# Patient Record
Sex: MALE | Race: WHITE | Employment: FULL TIME | URBAN - METROPOLITAN AREA
[De-identification: names, ages, dates, MRNs, and addresses within clinical notes are randomized per-mention and may not be internally consistent; named-entity substitution may affect disease eponyms.]

---

## 2017-09-18 PROBLEM — M54.50 CHRONIC BILATERAL LOW BACK PAIN WITHOUT SCIATICA: Status: ACTIVE | Noted: 2017-09-18

## 2017-09-18 PROBLEM — G89.29 CHRONIC BILATERAL LOW BACK PAIN WITHOUT SCIATICA: Status: ACTIVE | Noted: 2017-09-18

## 2018-04-15 ENCOUNTER — HOSPITAL ENCOUNTER (EMERGENCY)
Age: 33
Discharge: HOME | End: 2018-04-15
Payer: MEDICARE

## 2018-04-15 ENCOUNTER — APPOINTMENT (OUTPATIENT)
Dept: CT IMAGING | Age: 33
End: 2018-04-15
Payer: MEDICARE

## 2018-04-15 VITALS
WEIGHT: 190 LBS | HEIGHT: 69 IN | BODY MASS INDEX: 28.14 KG/M2 | TEMPERATURE: 98 F | OXYGEN SATURATION: 98 % | SYSTOLIC BLOOD PRESSURE: 122 MMHG | DIASTOLIC BLOOD PRESSURE: 76 MMHG | HEART RATE: 76 BPM | RESPIRATION RATE: 14 BRPM

## 2018-04-15 DIAGNOSIS — R10.9 ABDOMINAL PAIN, UNSPECIFIED ABDOMINAL LOCATION: Primary | ICD-10-CM

## 2018-04-15 DIAGNOSIS — I88.0 MESENTERIC LYMPHADENITIS: ICD-10-CM

## 2018-04-15 LAB
ALBUMIN SERPL-MCNC: 4.2 G/DL (ref 3.5–5.2)
ALP BLD-CCNC: 52 U/L (ref 40–129)
ALT SERPL-CCNC: 29 U/L (ref 0–40)
ANION GAP SERPL CALCULATED.3IONS-SCNC: 12 MMOL/L (ref 7–16)
AST SERPL-CCNC: 25 U/L (ref 0–39)
BASOPHILS ABSOLUTE: 0.03 E9/L (ref 0–0.2)
BASOPHILS RELATIVE PERCENT: 0.5 % (ref 0–2)
BILIRUB SERPL-MCNC: 0.3 MG/DL (ref 0–1.2)
BILIRUBIN URINE: NEGATIVE
BLOOD, URINE: NEGATIVE
BUN BLDV-MCNC: 16 MG/DL (ref 6–20)
CALCIUM SERPL-MCNC: 8.9 MG/DL (ref 8.6–10.2)
CHLORIDE BLD-SCNC: 102 MMOL/L (ref 98–107)
CLARITY: CLEAR
CO2: 28 MMOL/L (ref 22–29)
COLOR: YELLOW
CREAT SERPL-MCNC: 0.9 MG/DL (ref 0.7–1.2)
EOSINOPHILS ABSOLUTE: 0.11 E9/L (ref 0.05–0.5)
EOSINOPHILS RELATIVE PERCENT: 2 % (ref 0–6)
GFR AFRICAN AMERICAN: >60
GFR NON-AFRICAN AMERICAN: >60 ML/MIN/1.73
GLUCOSE BLD-MCNC: 82 MG/DL (ref 74–109)
GLUCOSE URINE: NEGATIVE MG/DL
HCT VFR BLD CALC: 44.6 % (ref 37–54)
HEMOGLOBIN: 15.8 G/DL (ref 12.5–16.5)
IMMATURE GRANULOCYTES #: 0.01 E9/L
IMMATURE GRANULOCYTES %: 0.2 % (ref 0–5)
KETONES, URINE: NEGATIVE MG/DL
LACTIC ACID: 0.9 MMOL/L (ref 0.5–2.2)
LEUKOCYTE ESTERASE, URINE: NEGATIVE
LIPASE: 27 U/L (ref 13–60)
LYMPHOCYTES ABSOLUTE: 1.64 E9/L (ref 1.5–4)
LYMPHOCYTES RELATIVE PERCENT: 30 % (ref 20–42)
MCH RBC QN AUTO: 30.3 PG (ref 26–35)
MCHC RBC AUTO-ENTMCNC: 35.4 % (ref 32–34.5)
MCV RBC AUTO: 85.6 FL (ref 80–99.9)
MONOCYTES ABSOLUTE: 0.61 E9/L (ref 0.1–0.95)
MONOCYTES RELATIVE PERCENT: 11.2 % (ref 2–12)
NEUTROPHILS ABSOLUTE: 3.06 E9/L (ref 1.8–7.3)
NEUTROPHILS RELATIVE PERCENT: 56.1 % (ref 43–80)
NITRITE, URINE: NEGATIVE
PDW BLD-RTO: 12 FL (ref 11.5–15)
PH UA: 6 (ref 5–9)
PLATELET # BLD: 145 E9/L (ref 130–450)
PMV BLD AUTO: 10.9 FL (ref 7–12)
POTASSIUM SERPL-SCNC: 3.9 MMOL/L (ref 3.5–5)
PROTEIN UA: NEGATIVE MG/DL
RBC # BLD: 5.21 E12/L (ref 3.8–5.8)
SODIUM BLD-SCNC: 142 MMOL/L (ref 132–146)
SPECIFIC GRAVITY UA: 1.02 (ref 1–1.03)
TOTAL PROTEIN: 6.7 G/DL (ref 6.4–8.3)
UROBILINOGEN, URINE: 0.2 E.U./DL
WBC # BLD: 5.5 E9/L (ref 4.5–11.5)

## 2018-04-15 PROCEDURE — 85025 COMPLETE CBC W/AUTO DIFF WBC: CPT

## 2018-04-15 PROCEDURE — 36415 COLL VENOUS BLD VENIPUNCTURE: CPT

## 2018-04-15 PROCEDURE — 83690 ASSAY OF LIPASE: CPT

## 2018-04-15 PROCEDURE — 81003 URINALYSIS AUTO W/O SCOPE: CPT

## 2018-04-15 PROCEDURE — 74177 CT ABD & PELVIS W/CONTRAST: CPT

## 2018-04-15 PROCEDURE — 83605 ASSAY OF LACTIC ACID: CPT

## 2018-04-15 PROCEDURE — 99284 EMERGENCY DEPT VISIT MOD MDM: CPT

## 2018-04-15 PROCEDURE — 80053 COMPREHEN METABOLIC PANEL: CPT

## 2018-04-15 PROCEDURE — 6360000004 HC RX CONTRAST MEDICATION: Performed by: RADIOLOGY

## 2018-04-15 PROCEDURE — 2580000003 HC RX 258: Performed by: NURSE PRACTITIONER

## 2018-04-15 PROCEDURE — 2580000003 HC RX 258: Performed by: RADIOLOGY

## 2018-04-15 RX ORDER — DICYCLOMINE HYDROCHLORIDE 10 MG/1
20 CAPSULE ORAL
Qty: 15 CAPSULE | Refills: 0 | Status: SHIPPED | OUTPATIENT
Start: 2018-04-15 | End: 2022-02-02

## 2018-04-15 RX ORDER — ISOTRETINOIN 40 MG/1
40 CAPSULE ORAL ONCE
COMMUNITY
End: 2022-01-19

## 2018-04-15 RX ORDER — DICYCLOMINE HYDROCHLORIDE 10 MG/ML
20 INJECTION INTRAMUSCULAR ONCE
Status: DISCONTINUED | OUTPATIENT
Start: 2018-04-15 | End: 2018-04-15 | Stop reason: HOSPADM

## 2018-04-15 RX ORDER — SODIUM CHLORIDE 0.9 % (FLUSH) 0.9 %
10 SYRINGE (ML) INJECTION
Status: COMPLETED | OUTPATIENT
Start: 2018-04-15 | End: 2018-04-15

## 2018-04-15 RX ORDER — 0.9 % SODIUM CHLORIDE 0.9 %
1000 INTRAVENOUS SOLUTION INTRAVENOUS ONCE
Status: COMPLETED | OUTPATIENT
Start: 2018-04-15 | End: 2018-04-15

## 2018-04-15 RX ORDER — DOCUSATE SODIUM 100 MG/1
100 CAPSULE, LIQUID FILLED ORAL 2 TIMES DAILY PRN
Qty: 20 CAPSULE | Refills: 0 | Status: SHIPPED | OUTPATIENT
Start: 2018-04-15 | End: 2018-04-20

## 2018-04-15 RX ORDER — IBUPROFEN 800 MG/1
800 TABLET ORAL EVERY 6 HOURS PRN
Qty: 20 TABLET | Refills: 0 | Status: SHIPPED | OUTPATIENT
Start: 2018-04-15 | End: 2022-02-02

## 2018-04-15 RX ADMIN — Medication 10 ML: at 14:48

## 2018-04-15 RX ADMIN — IOPAMIDOL 110 ML: 755 INJECTION, SOLUTION INTRAVENOUS at 14:48

## 2018-04-15 RX ADMIN — SODIUM CHLORIDE 1000 ML: 9 INJECTION, SOLUTION INTRAVENOUS at 13:12

## 2020-09-13 ENCOUNTER — APPOINTMENT (OUTPATIENT)
Dept: GENERAL RADIOLOGY | Age: 35
End: 2020-09-13
Payer: COMMERCIAL

## 2020-09-13 ENCOUNTER — HOSPITAL ENCOUNTER (EMERGENCY)
Age: 35
Discharge: HOME | End: 2020-09-13
Attending: EMERGENCY MEDICINE
Payer: COMMERCIAL

## 2020-09-13 VITALS
TEMPERATURE: 97.2 F | SYSTOLIC BLOOD PRESSURE: 118 MMHG | RESPIRATION RATE: 16 BRPM | WEIGHT: 185 LBS | BODY MASS INDEX: 27.4 KG/M2 | HEART RATE: 89 BPM | DIASTOLIC BLOOD PRESSURE: 76 MMHG | HEIGHT: 69 IN | OXYGEN SATURATION: 98 %

## 2020-09-13 PROCEDURE — 90471 IMMUNIZATION ADMIN: CPT | Performed by: STUDENT IN AN ORGANIZED HEALTH CARE EDUCATION/TRAINING PROGRAM

## 2020-09-13 PROCEDURE — 71045 X-RAY EXAM CHEST 1 VIEW: CPT

## 2020-09-13 PROCEDURE — 73030 X-RAY EXAM OF SHOULDER: CPT

## 2020-09-13 PROCEDURE — 99282 EMERGENCY DEPT VISIT SF MDM: CPT

## 2020-09-13 PROCEDURE — 6360000002 HC RX W HCPCS: Performed by: STUDENT IN AN ORGANIZED HEALTH CARE EDUCATION/TRAINING PROGRAM

## 2020-09-13 PROCEDURE — 96372 THER/PROPH/DIAG INJ SC/IM: CPT

## 2020-09-13 PROCEDURE — 90715 TDAP VACCINE 7 YRS/> IM: CPT | Performed by: STUDENT IN AN ORGANIZED HEALTH CARE EDUCATION/TRAINING PROGRAM

## 2020-09-13 PROCEDURE — 99283 EMERGENCY DEPT VISIT LOW MDM: CPT

## 2020-09-13 PROCEDURE — 73000 X-RAY EXAM OF COLLAR BONE: CPT

## 2020-09-13 RX ORDER — KETOROLAC TROMETHAMINE 30 MG/ML
30 INJECTION, SOLUTION INTRAMUSCULAR; INTRAVENOUS ONCE
Status: COMPLETED | OUTPATIENT
Start: 2020-09-13 | End: 2020-09-13

## 2020-09-13 RX ORDER — NAPROXEN 500 MG/1
500 TABLET ORAL 2 TIMES DAILY
Qty: 60 TABLET | Refills: 0 | Status: SHIPPED | OUTPATIENT
Start: 2020-09-13 | End: 2022-01-19

## 2020-09-13 RX ADMIN — TETANUS TOXOID, REDUCED DIPHTHERIA TOXOID AND ACELLULAR PERTUSSIS VACCINE, ADSORBED 0.5 ML: 5; 2.5; 8; 8; 2.5 SUSPENSION INTRAMUSCULAR at 08:02

## 2020-09-13 RX ADMIN — KETOROLAC TROMETHAMINE 30 MG: 30 INJECTION, SOLUTION INTRAMUSCULAR at 08:02

## 2020-09-13 ASSESSMENT — ENCOUNTER SYMPTOMS
WHEEZING: 0
CONSTIPATION: 0
ABDOMINAL PAIN: 0
SHORTNESS OF BREATH: 0
CHEST TIGHTNESS: 0
DIARRHEA: 0
VOMITING: 0
COUGH: 0
COLOR CHANGE: 0
NAUSEA: 0
BACK PAIN: 0

## 2020-09-13 ASSESSMENT — PAIN DESCRIPTION - FREQUENCY: FREQUENCY: CONTINUOUS

## 2020-09-13 ASSESSMENT — PAIN DESCRIPTION - ORIENTATION: ORIENTATION: RIGHT

## 2020-09-13 ASSESSMENT — PAIN SCALES - GENERAL
PAINLEVEL_OUTOF10: 8
PAINLEVEL_OUTOF10: 8

## 2020-09-13 ASSESSMENT — PAIN DESCRIPTION - PAIN TYPE: TYPE: ACUTE PAIN

## 2020-09-13 ASSESSMENT — PAIN DESCRIPTION - ONSET: ONSET: ON-GOING

## 2020-09-13 ASSESSMENT — PAIN DESCRIPTION - LOCATION: LOCATION: SHOULDER

## 2020-09-13 ASSESSMENT — PAIN DESCRIPTION - PROGRESSION: CLINICAL_PROGRESSION: NOT CHANGED

## 2020-09-13 ASSESSMENT — PAIN DESCRIPTION - DESCRIPTORS: DESCRIPTORS: ACHING;SHARP

## 2020-09-13 NOTE — ED NOTES
Patient has visitor waiting in car. Ricarda Chu, [phone]. RN to call when patient able to have visitor.       Peggy Macario RN  09/13/20 1012
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
## 2020-12-11 LAB — SARS-COV-2: NORMAL

## 2021-07-08 VITALS
BODY MASS INDEX: 27.85 KG/M2 | RESPIRATION RATE: 16 BRPM | HEART RATE: 57 BPM | HEIGHT: 69 IN | DIASTOLIC BLOOD PRESSURE: 84 MMHG | TEMPERATURE: 97.9 F | WEIGHT: 188 LBS | OXYGEN SATURATION: 98 % | SYSTOLIC BLOOD PRESSURE: 122 MMHG

## 2022-01-19 ENCOUNTER — HOSPITAL ENCOUNTER (EMERGENCY)
Age: 37
Discharge: HOME | End: 2022-01-19
Payer: COMMERCIAL

## 2022-01-19 ENCOUNTER — APPOINTMENT (OUTPATIENT)
Dept: GENERAL RADIOLOGY | Age: 37
End: 2022-01-19
Payer: COMMERCIAL

## 2022-01-19 VITALS
TEMPERATURE: 97.4 F | RESPIRATION RATE: 16 BRPM | SYSTOLIC BLOOD PRESSURE: 126 MMHG | OXYGEN SATURATION: 98 % | HEIGHT: 69 IN | DIASTOLIC BLOOD PRESSURE: 74 MMHG | BODY MASS INDEX: 23.11 KG/M2 | HEART RATE: 57 BPM | WEIGHT: 156 LBS

## 2022-01-19 DIAGNOSIS — S93.491A SPRAIN OF ANTERIOR TALOFIBULAR LIGAMENT OF RIGHT ANKLE, INITIAL ENCOUNTER: ICD-10-CM

## 2022-01-19 DIAGNOSIS — S92.214A CLOSED NONDISPLACED FRACTURE OF CUBOID OF RIGHT FOOT, INITIAL ENCOUNTER: Primary | ICD-10-CM

## 2022-01-19 PROCEDURE — 73610 X-RAY EXAM OF ANKLE: CPT

## 2022-01-19 PROCEDURE — 99283 EMERGENCY DEPT VISIT LOW MDM: CPT

## 2022-01-19 PROCEDURE — 73630 X-RAY EXAM OF FOOT: CPT

## 2022-01-19 ASSESSMENT — PAIN DESCRIPTION - ORIENTATION: ORIENTATION: RIGHT

## 2022-01-19 ASSESSMENT — PAIN DESCRIPTION - LOCATION: LOCATION: ANKLE

## 2022-01-19 ASSESSMENT — PAIN DESCRIPTION - PAIN TYPE: TYPE: ACUTE PAIN

## 2022-01-19 ASSESSMENT — PAIN SCALES - GENERAL: PAINLEVEL_OUTOF10: 8

## 2022-01-19 NOTE — Clinical Note
Nawaf Sifuetnes was seen and treated in our emergency department on 1/19/2022. He may return to work on 01/21/2022. If you have any questions or concerns, please don't hesitate to call.       Kimberly Strange, APRN - CNP

## 2022-01-20 NOTE — ED PROVIDER NOTES
[address]  Department of Emergency Medicine   ED  Encounter Note  Admit Date/RoomTime: 2022  9:21 PM  ED Room: 15/15    NAME: Treva Winter  : 1985  MRN: [id number]     Chief Complaint:  Ankle Pain (right ankle injured after jumping at [employer])    History of Present Illness       Treva Winter is a 31 y.o. old male who presents to the emergency department for evaluation of right ankle pain that occurred about 3 hours ago. Patient was at [employer] when he felt a pop in his ankle. He has no history of fracture, surgical intervention or dislocation of this area in the past.  He took a dose of ibuprofen with some improvement in his pain that is aggravated with ambulation. He has noticed some swelling and some faint bruising to the area with no open wound, redness or warmth. He reports his tetanus vaccination to be up-to-date. His symptoms are moderate in severity and persistent in nature. ROS   Pertinent positives and negatives are stated within HPI, all other systems reviewed and are negative. Past Medical History:  has no past medical history on file. Surgical History:  has no past surgical history on file. Social History:  reports that he has never smoked. He has quit using smokeless tobacco. He reports current alcohol use. He reports that he does not use drugs. Family History: family history is not on file. Allergies: Patient has no known allergies. Physical Exam   Oxygen Saturation Interpretation: Normal.        ED Triage Vitals   BP Temp Temp src Pulse Resp SpO2 Height Weight   22 -- 22   126/74 97.4 °F (36.3 °C)  57 16 98 % 5' 9\" (1.753 m) 156 lb (70.8 kg)         Constitutional:  Alert, development consistent with age. HEENT:  NC/NT. No outward sign of trauma. Airway patent. Neck:  Normal ROM. Supple.   Respiratory: No respiratory distress or increased work of breathing. Cardiovascular: Regular rate and rhythm. Strong distal pulses. Right Lower Extremity(s):              Tenderness: There is moderate tenderness to the proximal fifth metatarsal and lateral malleolus with no bony tenderness to the medial aspect of the foot, ankle or bony tenderness to the mid tib-fib, knee or posterior aspect of the leg. The Achilles tendon feels intact and without pain. Swelling: Mild lateral aspect of the foot with compartments. Calf:  Negative Ana Maria's sign. No cords or calf tenderness. No significant calf/ankle edema. .             Deformity: no deformity observed/palpated. ROM: full range with pain. Negative anterior drawer. Skin:  no wounds, erythema, or warmth. There is ecchymosis to the lateral aspect of the foot and mild swelling. Neurovascular: Motor deficit: none. Sensory deficit: none. Pulse deficit: none. Capillary refill: normal.  Gait:  limp due to affected limb with use of assistive device. Neurological:  Alert and oriented. Motor and sensory functions intact unless otherwise described above. Lab / Imaging Results   (All laboratory and radiology results have been personally reviewed by myself)  Labs:  No results found for this visit on 01/19/22. Imaging: All Radiology results interpreted by Radiologist unless otherwise noted. XR FOOT RIGHT (MIN 3 VIEWS)   Final Result   1. There appears to be a small avulsion injury off the lateral aspect of the   cuboid bone with regional soft tissue swelling. 2.  No additional fractures seen about the right ankle nor right foot. Overall normal alignment. Ankle mortise is maintained. XR ANKLE RIGHT (MIN 3 VIEWS)   Final Result   1. There appears to be a small avulsion injury off the lateral aspect of the   cuboid bone with regional soft tissue swelling.       2.  No additional fractures seen about the right ankle nor right foot. Overall normal alignment. Ankle mortise is maintained. ED Course / Medical Decision Making   Medications - No data to display       Re-examination:  1/19/22     Time: 2210  Patients symptoms show no change. Repeat physical examination is unchanged. Discussed results and treatment plan. Consults:   Time: 2200 images reviewed by myself and Dr. Matt Polanco. He recommends a walker boot and okay for weightbearing. Procedure(s):  None    MDM:      Neurovascularly intact. Imaging was obtained based as per history and physical exam findings. Interpretation as per radiologist positive for acute findings as documented above. Plan is subsequently for symptom control with pneumatic boot, OTC motrin prn, ice, elevation and with appropriate outpatient follow-up with podiatry as provided on their discharge handout. Patient is educated on S/S to watch for indicative of re-evaluation in the ER setting including worsening of current symptoms not responding to the treatment plan. Patient verbalized understanding of instructions and is amenable to this treatment plan. Patient departed in stable condition. Plan of Care/Counseling:  JASON Booth CNP reviewed today's visit with the patient and spouse in addition to providing specific details for the plan of care and counseling regarding the diagnosis and prognosis. Questions are answered at this time and are agreeable with the plan. Assessment      1. Closed nondisplaced fracture of cuboid of right foot, initial encounter    2. Sprain of anterior talofibular ligament of right ankle, initial encounter      Plan   Discharged home. Patient condition is stable    New Medications     Discharge Medication List as of 1/19/2022 10:12 PM        Electronically signed by JASON Booth CNP   DD: 1/19/22  **This report was transcribed using voice recognition software.  Every effort was made to ensure accuracy; however, inadvertent computerized transcription errors may be present.   END OF ED PROVIDER NOTE       JASON Dent - CNP  01/19/22 [id number]

## 2022-02-02 ENCOUNTER — OFFICE VISIT (OUTPATIENT)
Dept: FAMILY MEDICINE CLINIC | Age: 37
End: 2022-02-02
Payer: COMMERCIAL

## 2022-02-02 VITALS
HEIGHT: 69 IN | SYSTOLIC BLOOD PRESSURE: 118 MMHG | DIASTOLIC BLOOD PRESSURE: 71 MMHG | BODY MASS INDEX: 22.96 KG/M2 | HEART RATE: 61 BPM | RESPIRATION RATE: 20 BRPM | WEIGHT: 155 LBS | TEMPERATURE: 98.1 F

## 2022-02-02 DIAGNOSIS — Z00.00 ENCOUNTER FOR PREVENTATIVE ADULT HEALTH CARE EXAMINATION: Primary | ICD-10-CM

## 2022-02-02 DIAGNOSIS — G89.29 CHRONIC BILATERAL LOW BACK PAIN WITHOUT SCIATICA: ICD-10-CM

## 2022-02-02 DIAGNOSIS — S82.899D: ICD-10-CM

## 2022-02-02 DIAGNOSIS — M54.50 CHRONIC BILATERAL LOW BACK PAIN WITHOUT SCIATICA: ICD-10-CM

## 2022-02-02 DIAGNOSIS — Z83.2 FAMILY HISTORY OF CLOTTING DISORDER: ICD-10-CM

## 2022-02-02 PROBLEM — S82.899A AVULSION FRACTURE OF ANKLE: Status: ACTIVE | Noted: 2022-02-02

## 2022-02-02 PROCEDURE — 99385 PREV VISIT NEW AGE 18-39: CPT | Performed by: STUDENT IN AN ORGANIZED HEALTH CARE EDUCATION/TRAINING PROGRAM

## 2022-02-02 SDOH — ECONOMIC STABILITY: FOOD INSECURITY: WITHIN THE PAST 12 MONTHS, THE FOOD YOU BOUGHT JUST DIDN'T LAST AND YOU DIDN'T HAVE MONEY TO GET MORE.: NEVER TRUE

## 2022-02-02 SDOH — ECONOMIC STABILITY: FOOD INSECURITY: WITHIN THE PAST 12 MONTHS, YOU WORRIED THAT YOUR FOOD WOULD RUN OUT BEFORE YOU GOT MONEY TO BUY MORE.: NEVER TRUE

## 2022-02-02 ASSESSMENT — PATIENT HEALTH QUESTIONNAIRE - PHQ9
SUM OF ALL RESPONSES TO PHQ QUESTIONS 1-9: 0
2. FEELING DOWN, DEPRESSED OR HOPELESS: 0

## 2022-02-02 ASSESSMENT — SOCIAL DETERMINANTS OF HEALTH (SDOH): HOW HARD IS IT FOR YOU TO PAY FOR THE VERY BASICS LIKE FOOD, HOUSING, MEDICAL CARE, AND HEATING?: NOT HARD AT ALL

## 2022-02-02 NOTE — PROGRESS NOTES
Patient:  Diya Murillo 31 y.o. male     02/02/22      Chiefcomplaint:   Chief Complaint   Patient presents with    Establish Care     History of Present Illness     Establishing care  Previous malkovits    Recent ankle injury - avulsion fracture - following podiatry - considering therapy    Imaging review  No additional findings  Labs  No recent abnormals    fam hx  dad - anticoag long term for possible family clotting disorder    Social   Working - [employer]     2 kids - daughters 11 and 3    Prevention  No recent lab work  Diet good  Activity is appropriate    Recommend dental and optometry appointments regularly    Pertinent Medical, Family, Surgical, Social History:  Past Medical History:   Diagnosis Date    Chronic back pain      Physical Exam   Vitals: /71   Pulse 61   Temp 98.1 °F (36.7 °C) (Temporal)   Resp 20   Ht 5' 9\" (1.753 m)   Wt 155 lb (70.3 kg)   BMI 22.89 kg/m²   General Appearance: Alert, oriented, no acute distress  HEENT: No scleral icterus. No visible discharge from eyes  Neck: Not rigid. No visible masses  Chest wall/Lung: Clear to auscultation bilaterally,  respirations unlabored. No ronchi/wheezing/rales  Heart: RRR, no murmur  Abdomen: Soft, nontender  Extremities:  No edema  Skin: No rashes. No jaundice  Neuro: Alert and oriented        Psych: Appropriate mood and appropriate affect    Assessment and Plan   1. Encounter for preventative adult health care examination  -     CBC Auto Differential; Future  -     Lipid Panel; Future  -     Comprehensive Metabolic Panel; Future  -     HEMOGLOBIN A1C; Future  -     HIV-1 AND HIV-2 ANTIBODIES; Future  -     HEPATITIS C ANTIBODY; Future  2. Family history of clotting disorder  3. Chronic bilateral low back pain without sciatica  4. Closed avulsion fracture of ankle with routine healing, unspecified laterality, subsequent encounter      Return in about 1 year (around 2/2/2023).       Derrick Diaz DO This document may have been prepared at least partially through the use of voice recognition software. Although effort is taken to assure the accuracy of this document, it is possible that grammatical, syntax,  or spelling errors may occur.

## 2022-02-08 ENCOUNTER — HOSPITAL ENCOUNTER (OUTPATIENT)
Age: 37
Discharge: HOME | End: 2022-02-08
Payer: COMMERCIAL

## 2022-02-08 ENCOUNTER — HOSPITAL ENCOUNTER (OUTPATIENT)
Age: 37
Discharge: HOME | End: 2022-02-10
Payer: COMMERCIAL

## 2022-02-08 ENCOUNTER — HOSPITAL ENCOUNTER (OUTPATIENT)
Dept: GENERAL RADIOLOGY | Age: 37
Discharge: HOME | End: 2022-02-10
Payer: COMMERCIAL

## 2022-02-08 DIAGNOSIS — Z00.00 ENCOUNTER FOR PREVENTATIVE ADULT HEALTH CARE EXAMINATION: ICD-10-CM

## 2022-02-08 DIAGNOSIS — J32.4 CHRONIC PANSINUSITIS: ICD-10-CM

## 2022-02-08 LAB
ALBUMIN SERPL-MCNC: 4.9 G/DL (ref 3.5–5.2)
ALP BLD-CCNC: 54 U/L (ref 40–129)
ALT SERPL-CCNC: 22 U/L (ref 0–40)
ANION GAP SERPL CALCULATED.3IONS-SCNC: 10 MMOL/L (ref 7–16)
AST SERPL-CCNC: 27 U/L (ref 0–39)
BASOPHILS ABSOLUTE: 0.02 E9/L (ref 0–0.2)
BASOPHILS RELATIVE PERCENT: 0.5 % (ref 0–2)
BILIRUB SERPL-MCNC: 0.3 MG/DL (ref 0–1.2)
BUN BLDV-MCNC: 15 MG/DL (ref 6–20)
CALCIUM SERPL-MCNC: 10.2 MG/DL (ref 8.6–10.2)
CHLORIDE BLD-SCNC: 104 MMOL/L (ref 98–107)
CHOLESTEROL, TOTAL: 191 MG/DL (ref 0–199)
CO2: 28 MMOL/L (ref 22–29)
CREAT SERPL-MCNC: 0.9 MG/DL (ref 0.7–1.2)
EOSINOPHILS ABSOLUTE: 0.1 E9/L (ref 0.05–0.5)
EOSINOPHILS RELATIVE PERCENT: 2.6 % (ref 0–6)
GFR AFRICAN AMERICAN: >60
GFR NON-AFRICAN AMERICAN: >60 ML/MIN/1.73
GLUCOSE BLD-MCNC: 85 MG/DL (ref 74–99)
HBA1C MFR BLD: 4.9 % (ref 4–5.6)
HCT VFR BLD CALC: 45.5 % (ref 37–54)
HDLC SERPL-MCNC: 56 MG/DL
HEMOGLOBIN: 15.5 G/DL (ref 12.5–16.5)
IMMATURE GRANULOCYTES #: 0.01 E9/L
IMMATURE GRANULOCYTES %: 0.3 % (ref 0–5)
LDL CHOLESTEROL CALCULATED: 121 MG/DL (ref 0–99)
LYMPHOCYTES ABSOLUTE: 1.64 E9/L (ref 1.5–4)
LYMPHOCYTES RELATIVE PERCENT: 42.6 % (ref 20–42)
MCH RBC QN AUTO: 29.6 PG (ref 26–35)
MCHC RBC AUTO-ENTMCNC: 34.1 % (ref 32–34.5)
MCV RBC AUTO: 87 FL (ref 80–99.9)
MONOCYTES ABSOLUTE: 0.33 E9/L (ref 0.1–0.95)
MONOCYTES RELATIVE PERCENT: 8.6 % (ref 2–12)
NEUTROPHILS ABSOLUTE: 1.75 E9/L (ref 1.8–7.3)
NEUTROPHILS RELATIVE PERCENT: 45.4 % (ref 43–80)
PDW BLD-RTO: 12.5 FL (ref 11.5–15)
PLATELET # BLD: 138 E9/L (ref 130–450)
PMV BLD AUTO: 11.4 FL (ref 7–12)
POTASSIUM SERPL-SCNC: 4.4 MMOL/L (ref 3.5–5)
RBC # BLD: 5.23 E12/L (ref 3.8–5.8)
SODIUM BLD-SCNC: 142 MMOL/L (ref 132–146)
TOTAL PROTEIN: 7.3 G/DL (ref 6.4–8.3)
TRIGL SERPL-MCNC: 71 MG/DL (ref 0–149)
VLDLC SERPL CALC-MCNC: 14 MG/DL
WBC # BLD: 3.9 E9/L (ref 4.5–11.5)

## 2022-02-08 PROCEDURE — 36415 COLL VENOUS BLD VENIPUNCTURE: CPT

## 2022-02-08 PROCEDURE — 80061 LIPID PANEL: CPT

## 2022-02-08 PROCEDURE — 70220 X-RAY EXAM OF SINUSES: CPT

## 2022-02-08 PROCEDURE — 83036 HEMOGLOBIN GLYCOSYLATED A1C: CPT

## 2022-02-08 PROCEDURE — 80053 COMPREHEN METABOLIC PANEL: CPT

## 2022-02-08 PROCEDURE — 85025 COMPLETE CBC W/AUTO DIFF WBC: CPT

## 2022-02-08 PROCEDURE — 86703 HIV-1/HIV-2 1 RESULT ANTBDY: CPT

## 2022-02-08 PROCEDURE — 86803 HEPATITIS C AB TEST: CPT

## 2022-02-09 LAB
HEPATITIS C ANTIBODY INTERPRETATION: NORMAL
HIV-1 AND HIV-2 ANTIBODIES: NORMAL

## 2023-02-15 ENCOUNTER — OFFICE VISIT (OUTPATIENT)
Dept: FAMILY MEDICINE CLINIC | Age: 38
End: 2023-02-15
Payer: COMMERCIAL

## 2023-02-15 VITALS
DIASTOLIC BLOOD PRESSURE: 74 MMHG | WEIGHT: 171.8 LBS | HEART RATE: 72 BPM | BODY MASS INDEX: 25.45 KG/M2 | SYSTOLIC BLOOD PRESSURE: 114 MMHG | RESPIRATION RATE: 20 BRPM | HEIGHT: 69 IN | OXYGEN SATURATION: 98 %

## 2023-02-15 DIAGNOSIS — Z00.00 ANNUAL PHYSICAL EXAM: ICD-10-CM

## 2023-02-15 DIAGNOSIS — Z13.21 ENCOUNTER FOR VITAMIN DEFICIENCY SCREENING: ICD-10-CM

## 2023-02-15 DIAGNOSIS — Z76.89 ENCOUNTER TO ESTABLISH CARE: Primary | ICD-10-CM

## 2023-02-15 PROCEDURE — 99395 PREV VISIT EST AGE 18-39: CPT | Performed by: NEUROMUSCULOSKELETAL MEDICINE & OMM

## 2023-02-15 SDOH — HEALTH STABILITY: MENTAL HEALTH
STRESS IS WHEN SOMEONE FEELS TENSE, NERVOUS, ANXIOUS, OR CAN'T SLEEP AT NIGHT BECAUSE THEIR MIND IS TROUBLED. HOW STRESSED ARE YOU?: NOT AT ALL

## 2023-02-15 SDOH — SOCIAL STABILITY: SOCIAL INSECURITY: WITHIN THE LAST YEAR, HAVE YOU BEEN HUMILIATED OR EMOTIONALLY ABUSED IN OTHER WAYS BY YOUR PARTNER OR EX-PARTNER?: NO

## 2023-02-15 SDOH — ECONOMIC STABILITY: FOOD INSECURITY: WITHIN THE PAST 12 MONTHS, THE FOOD YOU BOUGHT JUST DIDN'T LAST AND YOU DIDN'T HAVE MONEY TO GET MORE.: NEVER TRUE

## 2023-02-15 SDOH — ECONOMIC STABILITY: HOUSING INSECURITY: IN THE LAST 12 MONTHS, HOW MANY PLACES HAVE YOU LIVED?: 1

## 2023-02-15 SDOH — ECONOMIC STABILITY: HOUSING INSECURITY
IN THE LAST 12 MONTHS, WAS THERE A TIME WHEN YOU DID NOT HAVE A STEADY PLACE TO SLEEP OR SLEPT IN A SHELTER (INCLUDING NOW)?: NO

## 2023-02-15 SDOH — ECONOMIC STABILITY: TRANSPORTATION INSECURITY
IN THE PAST 12 MONTHS, HAS THE LACK OF TRANSPORTATION KEPT YOU FROM MEDICAL APPOINTMENTS OR FROM GETTING MEDICATIONS?: NO

## 2023-02-15 SDOH — HEALTH STABILITY: PHYSICAL HEALTH: ON AVERAGE, HOW MANY MINUTES DO YOU ENGAGE IN EXERCISE AT THIS LEVEL?: 30 MIN

## 2023-02-15 SDOH — HEALTH STABILITY: PHYSICAL HEALTH: ON AVERAGE, HOW MANY DAYS PER WEEK DO YOU ENGAGE IN MODERATE TO STRENUOUS EXERCISE (LIKE A BRISK WALK)?: 1 DAY

## 2023-02-15 SDOH — SOCIAL STABILITY: SOCIAL NETWORK
DO YOU BELONG TO ANY CLUBS OR ORGANIZATIONS SUCH AS CHURCH GROUPS UNIONS, FRATERNAL OR ATHLETIC GROUPS, OR SCHOOL GROUPS?: NO

## 2023-02-15 SDOH — SOCIAL STABILITY: SOCIAL NETWORK: ARE YOU MARRIED, WIDOWED, DIVORCED, SEPARATED, NEVER MARRIED, OR LIVING WITH A PARTNER?: MARRIED

## 2023-02-15 SDOH — ECONOMIC STABILITY: INCOME INSECURITY: HOW HARD IS IT FOR YOU TO PAY FOR THE VERY BASICS LIKE FOOD, HOUSING, MEDICAL CARE, AND HEATING?: NOT HARD AT ALL

## 2023-02-15 SDOH — SOCIAL STABILITY: SOCIAL NETWORK
IN A TYPICAL WEEK, HOW MANY TIMES DO YOU TALK ON THE PHONE WITH FAMILY, FRIENDS, OR NEIGHBORS?: MORE THAN THREE TIMES A WEEK

## 2023-02-15 SDOH — SOCIAL STABILITY: SOCIAL NETWORK: HOW OFTEN DO YOU ATTEND CHURCH OR RELIGIOUS SERVICES?: MORE THAN 4 TIMES PER YEAR

## 2023-02-15 SDOH — HEALTH STABILITY: MENTAL HEALTH: HOW OFTEN DO YOU HAVE A DRINK CONTAINING ALCOHOL?: 2-4 TIMES A MONTH

## 2023-02-15 SDOH — ECONOMIC STABILITY: INCOME INSECURITY: IN THE LAST 12 MONTHS, WAS THERE A TIME WHEN YOU WERE NOT ABLE TO PAY THE MORTGAGE OR RENT ON TIME?: NO

## 2023-02-15 SDOH — SOCIAL STABILITY: SOCIAL NETWORK: HOW OFTEN DO YOU GET TOGETHER WITH FRIENDS OR RELATIVES?: MORE THAN THREE TIMES A WEEK

## 2023-02-15 SDOH — ECONOMIC STABILITY: FOOD INSECURITY: WITHIN THE PAST 12 MONTHS, YOU WORRIED THAT YOUR FOOD WOULD RUN OUT BEFORE YOU GOT MONEY TO BUY MORE.: NEVER TRUE

## 2023-02-15 SDOH — SOCIAL STABILITY: SOCIAL INSECURITY: WITHIN THE LAST YEAR, HAVE YOU BEEN AFRAID OF YOUR PARTNER OR EX-PARTNER?: NO

## 2023-02-15 SDOH — HEALTH STABILITY: MENTAL HEALTH: HOW MANY STANDARD DRINKS CONTAINING ALCOHOL DO YOU HAVE ON A TYPICAL DAY?: 1 OR 2

## 2023-02-15 SDOH — SOCIAL STABILITY: SOCIAL INSECURITY
WITHIN THE LAST YEAR, HAVE YOU BEEN KICKED, HIT, SLAPPED, OR OTHERWISE PHYSICALLY HURT BY YOUR PARTNER OR EX-PARTNER?: NO

## 2023-02-15 SDOH — ECONOMIC STABILITY: TRANSPORTATION INSECURITY
IN THE PAST 12 MONTHS, HAS LACK OF TRANSPORTATION KEPT YOU FROM MEETINGS, WORK, OR FROM GETTING THINGS NEEDED FOR DAILY LIVING?: NO

## 2023-02-15 SDOH — SOCIAL STABILITY: SOCIAL NETWORK: HOW OFTEN DO YOU ATTENT MEETINGS OF THE CLUB OR ORGANIZATION YOU BELONG TO?: NEVER

## 2023-02-15 ASSESSMENT — ENCOUNTER SYMPTOMS
VOMITING: 0
DIARRHEA: 0
ABDOMINAL PAIN: 0
WHEEZING: 0
SHORTNESS OF BREATH: 0
CHEST TIGHTNESS: 0
CONSTIPATION: 0
BLOOD IN STOOL: 0
CHOKING: 0
STRIDOR: 0
NAUSEA: 0
COUGH: 0
ABDOMINAL DISTENTION: 0

## 2023-02-15 ASSESSMENT — PATIENT HEALTH QUESTIONNAIRE - PHQ9
1. LITTLE INTEREST OR PLEASURE IN DOING THINGS: 0
SUM OF ALL RESPONSES TO PHQ QUESTIONS 1-9: 0
2. FEELING DOWN, DEPRESSED OR HOPELESS: 0
SUM OF ALL RESPONSES TO PHQ QUESTIONS 1-9: 0
SUM OF ALL RESPONSES TO PHQ9 QUESTIONS 1 & 2: 0
SUM OF ALL RESPONSES TO PHQ QUESTIONS 1-9: 0
SUM OF ALL RESPONSES TO PHQ QUESTIONS 1-9: 0

## 2023-02-15 ASSESSMENT — LIFESTYLE VARIABLES
HOW MANY STANDARD DRINKS CONTAINING ALCOHOL DO YOU HAVE ON A TYPICAL DAY: 1 OR 2
HOW OFTEN DO YOU HAVE A DRINK CONTAINING ALCOHOL: 2-4 TIMES A MONTH

## 2023-02-15 NOTE — PROGRESS NOTES
2/15/2023    Kizzy Noonan (:  1985) is a 30 y.o. male, here for a preventive medicine evaluation. Patient Active Problem List   Diagnosis    Chronic bilateral low back pain without sciatica    Family history of clotting disorder    Avulsion fracture of ankle    Annual physical exam       Review of Systems   Constitutional:  Negative for activity change, appetite change, chills, diaphoresis, fatigue and fever. HENT:  Negative for dental problem and hearing loss. Eyes:  Negative for visual disturbance. Respiratory:  Negative for cough, choking, chest tightness, shortness of breath, wheezing and stridor. Cardiovascular:  Negative for chest pain, palpitations and leg swelling. Gastrointestinal:  Negative for abdominal distention, abdominal pain, blood in stool, constipation, diarrhea, nausea and vomiting. Genitourinary:  Negative for difficulty urinating. Skin:  Negative for rash. Neurological:  Negative for dizziness, seizures, weakness, light-headedness, numbness and headaches. Psychiatric/Behavioral:  Negative for sleep disturbance. Prior to Visit Medications    Not on File        No Known Allergies    Past Medical History:   Diagnosis Date    Chronic back pain        History reviewed. No pertinent surgical history.     Social History     Socioeconomic History    Marital status:      Spouse name: Not on file    Number of children: Not on file    Years of education: Not on file    Highest education level: Not on file   Occupational History    Not on file   Tobacco Use    Smoking status: Never    Smokeless tobacco: Former   Vaping Use    Vaping Use: Never used   Substance and Sexual Activity    Alcohol use: Yes     Comment: social    Drug use: No    Sexual activity: Not on file   Other Topics Concern    Not on file   Social History Narrative    Not on file     Social Determinants of Health     Financial Resource Strain: Low Risk     Difficulty of Paying Living Expenses: Not hard at all   Food Insecurity: No Food Insecurity    Worried About [address] in the Last Year: Never true    Ran Out of Food in the Last Year: Never true   Transportation Needs: No Transportation Needs    Lack of Transportation (Medical): No    Lack of Transportation (Non-Medical): No   Physical Activity: Insufficiently Active    Days of Exercise per Week: 1 day    Minutes of Exercise per Session: 30 min   Stress: No Stress Concern Present    Feeling of Stress : Not at all   Social Connections: Moderately Integrated    Frequency of Communication with Friends and Family: More than three times a week    Frequency of Social Gatherings with Friends and Family: More than three times a week    Attends [employer] Services: More than 4 times per year    Active Member of [employer] or Organizations: No    Attends Club or Organization Meetings: Never    Marital Status:    Intimate Partner Violence: Unknown    Fear of Current or Ex-Partner: No    Emotionally Abused: No    Physically Abused: No    Sexually Abused: Not on file   Housing Stability: [address] to Pay for Housing in the Last Year: No    Number of Places Lived in the Last Year: 1    Unstable Housing in the Last Year: No        Family History   Problem Relation Age of Onset    Heart Disease Maternal Grandfather        ADVANCE DIRECTIVE: N, <no information>    Vitals:    02/15/23 0839   BP: 114/74   Pulse: 72   Resp: 20   SpO2: 98%   Weight: 171 lb 12.8 oz (77.9 kg)   Height: 5' 9\" (1.753 m)     Estimated body mass index is 25.37 kg/m² as calculated from the following:    Height as of this encounter: 5' 9\" (1.753 m). Weight as of this encounter: 171 lb 12.8 oz (77.9 kg). Physical Exam  Vitals and nursing note reviewed. Constitutional:       General: He is not in acute distress. Appearance: Normal appearance. He is normal weight. He is not ill-appearing or diaphoretic. HENT:      Head: Normocephalic and atraumatic.    Eyes: General: No scleral icterus. Right eye: No discharge. Left eye: No discharge. Conjunctiva/sclera: Conjunctivae normal.   Cardiovascular:      Rate and Rhythm: Normal rate and regular rhythm. Pulses: Normal pulses. Heart sounds: Normal heart sounds. No murmur heard. No friction rub. No gallop. Pulmonary:      Effort: Pulmonary effort is normal. No respiratory distress. Breath sounds: Normal breath sounds. No stridor. No wheezing, rhonchi or rales. Abdominal:      General: Bowel sounds are normal.      Palpations: Abdomen is soft. Musculoskeletal:      Right lower leg: No edema. Left lower leg: No edema. Lymphadenopathy:      Cervical: No cervical adenopathy. Skin:     General: Skin is warm and dry. Findings: No rash. Neurological:      General: No focal deficit present. Mental Status: He is alert and oriented to person, place, and time. Mental status is at baseline. Psychiatric:         Mood and Affect: Mood normal.         Behavior: Behavior normal.       No flowsheet data found. Lab Results   Component Value Date/Time    CHOL 191 02/08/2022 07:40 AM    TRIG 71 02/08/2022 07:40 AM    HDL 56 02/08/2022 07:40 AM    LDLCALC 121 02/08/2022 07:40 AM    GLUCOSE 85 02/08/2022 07:40 AM    LABA1C 4.9 02/08/2022 07:40 AM       The ASCVD Risk score (Joe DK, et al., 2019) failed to calculate for the following reasons:     The 2019 ASCVD risk score is only valid for ages 36 to 78    Immunization History   Administered Date(s) Administered    Hepatitis B Ped/Adol (Engerix-B, Recombivax HB) 05/16/2000, 06/26/2000, 06/04/2001    MMR 05/16/2000    Tdap (Boostrix, Adacel) 09/13/2020       Health Maintenance   Topic Date Due    COVID-19 Vaccine (1) Never done    Varicella vaccine (1 of 2 - 2-dose childhood series) Never done    Flu vaccine (1) Never done    Depression Screen  02/02/2023    DTaP/Tdap/Td vaccine (2 - Td or Tdap) 09/13/2030    Hepatitis C screen Completed    HIV screen  Completed    Hepatitis A vaccine  Aged Out    Hib vaccine  Aged Out    Meningococcal (ACWY) vaccine  Aged Out    Pneumococcal 0-64 years Vaccine  Aged Out       Assessment & Plan   Encounter to establish care  Annual physical exam  -     CBC with Auto Differential; Future  -     Comprehensive Metabolic Panel; Future  -     Hemoglobin A1C; Future  -     Lipid Panel; Future  Encounter for vitamin deficiency screening  -     Vitamin D 25 Hydroxy; Future    Return in about 1 year (around 2/15/2024).          --Ann-Marie Pena, DO

## 2023-03-17 PROBLEM — Z00.00 ANNUAL PHYSICAL EXAM: Status: RESOLVED | Noted: 2023-02-15 | Resolved: 2023-03-17

## 2024-02-23 ENCOUNTER — OFFICE VISIT (OUTPATIENT)
Dept: PRIMARY CARE CLINIC | Age: 39
End: 2024-02-23
Payer: COMMERCIAL

## 2024-02-23 VITALS
HEART RATE: 52 BPM | WEIGHT: 182 LBS | OXYGEN SATURATION: 100 % | TEMPERATURE: 97.2 F | HEIGHT: 69 IN | DIASTOLIC BLOOD PRESSURE: 80 MMHG | SYSTOLIC BLOOD PRESSURE: 118 MMHG | BODY MASS INDEX: 26.96 KG/M2

## 2024-02-23 DIAGNOSIS — J01.90 ACUTE BACTERIAL SINUSITIS: Primary | ICD-10-CM

## 2024-02-23 DIAGNOSIS — R05.1 ACUTE COUGH: ICD-10-CM

## 2024-02-23 DIAGNOSIS — B96.89 ACUTE BACTERIAL SINUSITIS: Primary | ICD-10-CM

## 2024-02-23 PROCEDURE — 99213 OFFICE O/P EST LOW 20 MIN: CPT | Performed by: NURSE PRACTITIONER

## 2024-02-23 RX ORDER — DEXTROMETHORPHAN HYDROBROMIDE AND PROMETHAZINE HYDROCHLORIDE 15; 6.25 MG/5ML; MG/5ML
5 SYRUP ORAL 4 TIMES DAILY PRN
Qty: 180 ML | Refills: 0 | Status: SHIPPED | OUTPATIENT
Start: 2024-02-23 | End: 2024-03-01

## 2024-02-23 RX ORDER — AZITHROMYCIN 250 MG/1
TABLET, FILM COATED ORAL
Qty: 6 TABLET | Refills: 0 | Status: SHIPPED | OUTPATIENT
Start: 2024-02-23 | End: 2024-03-04

## 2024-02-23 RX ORDER — PREDNISONE 10 MG/1
10 TABLET ORAL 2 TIMES DAILY
Qty: 10 TABLET | Refills: 0 | Status: SHIPPED | OUTPATIENT
Start: 2024-02-23 | End: 2024-02-28

## 2024-02-23 NOTE — PROGRESS NOTES
no obvious septal defect.  Diffuse redness/edema  Mouth:  Moist bucca mucosa and normal tongue.    Throat: Mild posterior pharyngeal erythema without exudates or lesions.   Neck:  Supple. There is no obvious adenopathy or neck tenderness.  Lungs:   Breath sounds: Normal chest expansion and breath sounds noted throughout.  No wheezes, rales, or rhonchi noted.    Heart:  Regular rate and rhythm, normal heart sounds, without pathological murmurs, ectopy, gallops, or rubs.  Skin:  Normal turgor.  Warm, dry, without visible rash.  Neurological:  Oriented.  Motor functions intact.       Lab / Imaging Results   (All laboratory and radiology results have been personally reviewed by myself)  Labs:  No results found for this visit on 02/23/24.    Imaging:  All Radiology results interpreted by Radiologist unless otherwise noted.  No orders to display         Assessment / Plan     Impression(s):  1. Acute bacterial sinusitis    2. Acute cough      Disposition:  Disposition: Advised to start Zithromax, Prednisone and Promethazine DM- advised on sedating effects. Stay well hydrated. Return to walk in care w/any worsening or concerning issues

## 2025-01-07 ASSESSMENT — PATIENT HEALTH QUESTIONNAIRE - PHQ9
SUM OF ALL RESPONSES TO PHQ QUESTIONS 1-9: 0
1. LITTLE INTEREST OR PLEASURE IN DOING THINGS: NOT AT ALL
SUM OF ALL RESPONSES TO PHQ9 QUESTIONS 1 & 2: 0
2. FEELING DOWN, DEPRESSED OR HOPELESS: NOT AT ALL
2. FEELING DOWN, DEPRESSED OR HOPELESS: NOT AT ALL
SUM OF ALL RESPONSES TO PHQ QUESTIONS 1-9: 0
1. LITTLE INTEREST OR PLEASURE IN DOING THINGS: NOT AT ALL
SUM OF ALL RESPONSES TO PHQ9 QUESTIONS 1 & 2: 0

## 2025-01-08 ENCOUNTER — HOSPITAL ENCOUNTER (OUTPATIENT)
Age: 40
Discharge: HOME | End: 2025-01-08
Payer: COMMERCIAL

## 2025-01-08 ENCOUNTER — OFFICE VISIT (OUTPATIENT)
Dept: FAMILY MEDICINE CLINIC | Age: 40
End: 2025-01-08
Payer: COMMERCIAL

## 2025-01-08 VITALS
OXYGEN SATURATION: 98 % | TEMPERATURE: 98.2 F | SYSTOLIC BLOOD PRESSURE: 115 MMHG | HEIGHT: 69 IN | HEART RATE: 77 BPM | WEIGHT: 189.6 LBS | BODY MASS INDEX: 28.08 KG/M2 | RESPIRATION RATE: 18 BRPM | DIASTOLIC BLOOD PRESSURE: 80 MMHG

## 2025-01-08 DIAGNOSIS — Z00.00 ENCOUNTER FOR WELL ADULT EXAM WITHOUT ABNORMAL FINDINGS: ICD-10-CM

## 2025-01-08 DIAGNOSIS — Z00.00 ANNUAL PHYSICAL EXAM: ICD-10-CM

## 2025-01-08 DIAGNOSIS — Z00.00 ANNUAL PHYSICAL EXAM: Primary | ICD-10-CM

## 2025-01-08 DIAGNOSIS — M79.671 RIGHT FOOT PAIN: ICD-10-CM

## 2025-01-08 LAB
25(OH)D3 SERPL-MCNC: 29.5 NG/ML (ref 30–100)
ALBUMIN SERPL-MCNC: 4.8 G/DL (ref 3.5–5.2)
ALP SERPL-CCNC: 56 U/L (ref 40–129)
ALT SERPL-CCNC: 14 U/L (ref 0–40)
ANION GAP SERPL CALCULATED.3IONS-SCNC: 9 MMOL/L (ref 7–16)
AST SERPL-CCNC: 27 U/L (ref 0–39)
BASOPHILS # BLD: 0.04 K/UL (ref 0–0.2)
BASOPHILS NFR BLD: 1 % (ref 0–2)
BILIRUB SERPL-MCNC: 0.5 MG/DL (ref 0–1.2)
BUN SERPL-MCNC: 16 MG/DL (ref 6–20)
CALCIUM SERPL-MCNC: 10 MG/DL (ref 8.6–10.2)
CHLORIDE SERPL-SCNC: 103 MMOL/L (ref 98–107)
CHOLEST SERPL-MCNC: 282 MG/DL
CO2 SERPL-SCNC: 29 MMOL/L (ref 22–29)
CREAT SERPL-MCNC: 1 MG/DL (ref 0.7–1.2)
EOSINOPHIL # BLD: 0.03 K/UL (ref 0.05–0.5)
EOSINOPHILS RELATIVE PERCENT: 1 % (ref 0–6)
ERYTHROCYTE [DISTWIDTH] IN BLOOD BY AUTOMATED COUNT: 12.1 % (ref 11.5–15)
GFR, ESTIMATED: >90 ML/MIN/1.73M2
GLUCOSE SERPL-MCNC: 96 MG/DL (ref 74–99)
HCT VFR BLD AUTO: 46.6 % (ref 37–54)
HDLC SERPL-MCNC: 51 MG/DL
HGB BLD-MCNC: 16.5 G/DL (ref 12.5–16.5)
IMM GRANULOCYTES # BLD AUTO: <0.03 K/UL (ref 0–0.58)
IMM GRANULOCYTES NFR BLD: 0 % (ref 0–5)
LDLC SERPL CALC-MCNC: 218 MG/DL
LYMPHOCYTES NFR BLD: 1.83 K/UL (ref 1.5–4)
LYMPHOCYTES RELATIVE PERCENT: 39 % (ref 20–42)
MCH RBC QN AUTO: 29.7 PG (ref 26–35)
MCHC RBC AUTO-ENTMCNC: 35.4 G/DL (ref 32–34.5)
MCV RBC AUTO: 83.8 FL (ref 80–99.9)
MONOCYTES NFR BLD: 0.44 K/UL (ref 0.1–0.95)
MONOCYTES NFR BLD: 9 % (ref 2–12)
NEUTROPHILS NFR BLD: 51 % (ref 43–80)
NEUTS SEG NFR BLD: 2.41 K/UL (ref 1.8–7.3)
PLATELET # BLD AUTO: 167 K/UL (ref 130–450)
PMV BLD AUTO: 10.5 FL (ref 7–12)
POTASSIUM SERPL-SCNC: 4.4 MMOL/L (ref 3.5–5)
PROT SERPL-MCNC: 7.5 G/DL (ref 6.4–8.3)
RBC # BLD AUTO: 5.56 M/UL (ref 3.8–5.8)
SODIUM SERPL-SCNC: 141 MMOL/L (ref 132–146)
TRIGL SERPL-MCNC: 64 MG/DL
VLDLC SERPL CALC-MCNC: 13 MG/DL
WBC OTHER # BLD: 4.8 K/UL (ref 4.5–11.5)

## 2025-01-08 PROCEDURE — 82306 VITAMIN D 25 HYDROXY: CPT

## 2025-01-08 PROCEDURE — 36415 COLL VENOUS BLD VENIPUNCTURE: CPT

## 2025-01-08 PROCEDURE — 99395 PREV VISIT EST AGE 18-39: CPT | Performed by: NEUROMUSCULOSKELETAL MEDICINE & OMM

## 2025-01-08 PROCEDURE — 85025 COMPLETE CBC W/AUTO DIFF WBC: CPT

## 2025-01-08 PROCEDURE — 80053 COMPREHEN METABOLIC PANEL: CPT

## 2025-01-08 PROCEDURE — 80061 LIPID PANEL: CPT

## 2025-01-08 ASSESSMENT — ENCOUNTER SYMPTOMS
BLOOD IN STOOL: 0
DIARRHEA: 0
SHORTNESS OF BREATH: 0
CHOKING: 0
COUGH: 0
BACK PAIN: 0
NAUSEA: 0
CONSTIPATION: 0
WHEEZING: 0
VOMITING: 0
ABDOMINAL DISTENTION: 0
CHEST TIGHTNESS: 0
ABDOMINAL PAIN: 0

## 2025-01-08 NOTE — PROGRESS NOTES
Well Adult Note  Name: German Lee Today’s Date: 2025   MRN: [id number] Sex: Male   Age: 39 y.o. Ethnicity: Non- / Non    : 1985 Race: White (non-)      German Lee is here for a well adult exam.          Assessment & Plan  1. Annual wellness examination.  He reports no major problems since the last visit and does not take any prescription medications. He takes a multivitamin and has not had blood work done in a couple of years. He reports no issues with sleep, lightheadedness, dizziness, headaches, dental, or vision problems. His weight has fluctuated due to decreased physical activity. There is no personal or family history of prostate or colon cancer. A comprehensive blood panel will be ordered to assess liver function, kidney function, glucose levels, cholesterol levels, and vitamin D levels. He is advised to fast after 10 PM the night before the test, except for water. Prostate screening will commence at age 40 through blood work, including PSA levels.    2. Right foot pain.  He reports pain on the ball of his right foot under the middle toe for the past three months, rated 2-3/10. There is no known recent injury, but he had a sprained ankle and hairline fracture in , which did not require surgery. He has not taken any over-the-counter pain medications for this issue. A referral to Dr. Ellington, a podiatrist, will be made for further evaluation. He should receive a call within 7 to 10 days. If he does not hear from Dr. Ellington, he is advised to contact the office.    Follow-up  The patient will follow up in 1 year, or earlier if necessary.  Annual physical exam  -     CBC with Auto Differential; Future  -     Comprehensive Metabolic Panel; Future  -     Lipid Panel; Future  -     Vitamin D 25 Hydroxy; Future  Right foot pain  -     [facility] DPM, Podiatry, [place]  Encounter for well adult exam without abnormal findings    Results       Return in 1

## 2025-02-27 ENCOUNTER — OFFICE VISIT (OUTPATIENT)
Dept: PODIATRY | Age: 40
End: 2025-02-27
Payer: COMMERCIAL

## 2025-02-27 VITALS — HEIGHT: 69 IN | WEIGHT: 174 LBS | BODY MASS INDEX: 25.77 KG/M2

## 2025-02-27 DIAGNOSIS — M77.41 METATARSALGIA OF RIGHT FOOT: Primary | ICD-10-CM

## 2025-02-27 DIAGNOSIS — M79.671 RIGHT FOOT PAIN: ICD-10-CM

## 2025-02-27 PROCEDURE — 99203 OFFICE O/P NEW LOW 30 MIN: CPT | Performed by: PODIATRIST

## 2025-02-27 NOTE — PROGRESS NOTES
Patient presents today as a new patient referred by Les Bill DO for right foot pain.  The pain is in the ball of his foot, onset since October.  Not consistent, comes and goes.  They were last seen by PCP, Les Bill DO, on 2025.    Electronically signed by Odessa Roger MA on 2025 at 10:36 AM    German Lee : 1985 Sex: male  Age: 39 y.o.    Patient was referred by Les Bill DO    CC:    Right foot pain symptoms several months    HPI:   This pleasant 39-year-old male patient for me today right foot pain primarily on the ball of the right foot.  No injury or trauma is aware of.  Tenderness is present several months.  Does state pain comes and goes.  No significant swelling.  No recent formal treatment or therapy.  No current custom or over-the-counter inserts.  New radiographs obtained today.  No additional pedal complaints at this time.    ROS:  Const: Denies constitutional symptoms  Musculo: Denies symptoms other than stated above  Skin: Denies symptoms other than stated above     No current outpatient medications on file.  No Known Allergies    Past Medical History:   Diagnosis Date    Chronic back pain            Vitals:    25 1034   Weight: 78.9 kg (174 lb)   Height: 1.753 m (5' 9\")       Work History/Social History:   Foot and ankle history:     Focused Lower Extremity Physical Exam:    Neurovascular examination:    Dorsalis Pedis palpable bilateral.  Posterior tibialis palpable bilateral.    Capillary Refill Time:  Immediate return  Hair growth:  Symmetrical and bilateral   Skin:  Not atrophic  Edema: No edema bilateral feet or ankles.  Neurologic:  Light touch intact bilateral.      Musculoskeletal/ Orthopedic examination:    Equinis: Absent bilateral  Dorsiflexion, plantarflexion, inversion, eversion bilateral 5 out of 5 muscle strength  Wiggling toes  Negative Homans  Tenderness plantar second and third metatarsal head right foot.  No pain

## 2025-04-14 ENCOUNTER — OFFICE VISIT (OUTPATIENT)
Dept: PRIMARY CARE CLINIC | Age: 40
End: 2025-04-14
Payer: COMMERCIAL

## 2025-04-14 VITALS
TEMPERATURE: 98 F | SYSTOLIC BLOOD PRESSURE: 107 MMHG | WEIGHT: 178 LBS | DIASTOLIC BLOOD PRESSURE: 75 MMHG | OXYGEN SATURATION: 96 % | HEIGHT: 69 IN | BODY MASS INDEX: 26.36 KG/M2 | HEART RATE: 69 BPM

## 2025-04-14 DIAGNOSIS — B96.89 ACUTE BACTERIAL SINUSITIS: Primary | ICD-10-CM

## 2025-04-14 DIAGNOSIS — J01.90 ACUTE BACTERIAL SINUSITIS: Primary | ICD-10-CM

## 2025-04-14 PROCEDURE — 99213 OFFICE O/P EST LOW 20 MIN: CPT | Performed by: NURSE PRACTITIONER

## 2025-04-14 RX ORDER — PREDNISONE 10 MG/1
10 TABLET ORAL 2 TIMES DAILY
Qty: 10 TABLET | Refills: 0 | Status: SHIPPED | OUTPATIENT
Start: 2025-04-14 | End: 2025-04-19

## 2025-04-14 RX ORDER — AZITHROMYCIN 250 MG/1
TABLET, FILM COATED ORAL
Qty: 6 TABLET | Refills: 0 | Status: SHIPPED | OUTPATIENT
Start: 2025-04-14 | End: 2025-04-24

## 2025-04-14 NOTE — PROGRESS NOTES
Chief Complaint:   Congestion (Sinus congestion for 9 days )      History of Present Illness   Source of history provided by:  patient.      German Lee is a 39 y.o. old male with a past medical history of:   Past Medical History:   Diagnosis Date    Chronic back pain         Pt presents to the [facility] with sinus pressure/congestion/headaches for the past 9 days.  No fever noted.    Denies any N/V/D, abdominal pain, CP, progressive SOB, dizziness, or lethargy.         ROS    Unless otherwise stated in this report or unable to obtain because of the patient's clinical or mental status as evidenced by the medical record, this patients's positive and negative responses for Review of Systems, constitutional, psych, eyes, ENT, cardiovascular, respiratory, gastrointestinal, neurological, genitourinary, musculoskeletal, integument systems and systems related to the presenting problem are either stated in the preceding or were not pertinent or were negative for the symptoms and/or complaints related to the medical problem.    Past Surgical History:  has no past surgical history on file.  Social History:  reports that he has never smoked. He has quit using smokeless tobacco. He reports current alcohol use. He reports that he does not use drugs.  Family History: family history includes Heart Disease in his maternal grandfather.   Allergies: Patient has no known allergies.    Physical Exam         VS:  /75 (BP Site: Right Upper Arm)   Pulse 69   Temp 98 °F (36.7 °C)   Ht 1.753 m (5' 9\")   Wt 80.7 kg (178 lb)   SpO2 96%   BMI 26.29 kg/m²    Oxygen Saturation Interpretation: Normal.    Constitutional:  Alert, development consistent with age.  Ears:  External Ears: Normal bilateral pinna.             TM's & External Canals: TM's normal bilaterally without perforation.  Canals without erythema or drainage.    Nose:   There is no obvious septal defect.  Diffuse redness/edema, sinus tenderness present  Mouth: